# Patient Record
Sex: MALE | Race: WHITE
[De-identification: names, ages, dates, MRNs, and addresses within clinical notes are randomized per-mention and may not be internally consistent; named-entity substitution may affect disease eponyms.]

---

## 2017-05-25 NOTE — RO
DATE OF PROCEDURE:  05/24/2017

 

PREPROCEDURE DIAGNOSIS:  Cataract of right eye.

 

POSTPROCEDURE DIAGNOSIS:  Cataract of right eye.

 

PROCEDURE:  Femtosecond laser and phacoemulsification of the intraocular lens

with lens implantation right eye.  Intraocular lens power used was Hoya, 17.5

diopter.

 

SURGEON:  Burak Maxwell MD

 

ASSISTANT:  None.

 

ANESTHESIA:  Local IV standby.

 

FINDINGS:  Cataract of right eye.

 

COMPLICATIONS:  None.

 

DESCRIPTION OF PROCEDURE:  The patient was brought to the operating room and laid

in supine position.  A lid speculum was placed, and patient was brought under the

femtosecond laser.  After the satisfactory placement of the patient interface,

primary incision, secondary incision, and arcuate incisions with lens

fragmentation was done without any complication per plan.  The patients interface

was then removed and lid speculum removed.

 

Patient was placed under the microscope.  The eye was prepped and draped in a

sterile fashion for ophthalmic surgery.  Lid speculum was placed.  The secondary

incision was opened, and EndoCoat was injected into the anterior chamber.  The

temporal clear corneal incision was then opened and capsulorrhexis removed,

followed by hydrodissection.  This was followed by phacoemulsification of the

lens within the capsular bag.  Cortical material was then aspirated, and Healon

was injected into the capsular bag.  Intraocular lens was then placed.  Excess

Healon was aspirated.  Wound was hydrated.  The lid speculum was removed, and

patient was returned to the recovery room in stable condition.

## 2018-09-19 ENCOUNTER — HOSPITAL ENCOUNTER (OUTPATIENT)
Dept: HOSPITAL 53 - M SDC | Age: 68
Discharge: HOME | End: 2018-09-19
Attending: OPHTHALMOLOGY
Payer: MEDICARE

## 2018-09-19 DIAGNOSIS — Z79.82: ICD-10-CM

## 2018-09-19 DIAGNOSIS — I10: ICD-10-CM

## 2018-09-19 DIAGNOSIS — H25.9: Primary | ICD-10-CM

## 2018-09-19 DIAGNOSIS — E78.5: ICD-10-CM

## 2018-09-19 DIAGNOSIS — N40.0: ICD-10-CM

## 2018-09-19 DIAGNOSIS — Z79.899: ICD-10-CM

## 2018-09-19 PROCEDURE — 66984 XCAPSL CTRC RMVL W/O ECP: CPT

## 2018-09-19 RX ADMIN — CEFUROXIME 1 MG: 750 INJECTION, POWDER, FOR SOLUTION INTRAMUSCULAR; INTRAVENOUS at 10:37

## 2018-09-19 RX ADMIN — POVIDONE-IODINE 1 DROP: 5 SOLUTION OPHTHALMIC at 10:36

## 2018-09-19 RX ADMIN — Medication 1 EA: at 10:36

## 2018-09-19 RX ADMIN — LIDOCAINE HYDROCHLORIDE 1 %: 35 GEL OPHTHALMIC at 09:05

## 2018-09-19 RX ADMIN — Medication 1 EA: at 07:00

## 2018-09-19 RX ADMIN — CYCLOPENTOLATE HYDROCHLORIDE 1 DROP: 20 SOLUTION/ DROPS OPHTHALMIC at 09:05

## 2018-09-19 RX ADMIN — OFLOXACIN 1 DROP: 3 SOLUTION/ DROPS OPHTHALMIC at 09:05

## 2018-09-19 RX ADMIN — ACETAZOLAMIDE 1 MG: 500 CAPSULE, EXTENDED RELEASE ORAL at 11:15

## 2018-09-19 RX ADMIN — TROPICAMIDE 1 DROP: 10 SOLUTION/ DROPS OPHTHALMIC at 09:05

## 2018-09-19 RX ADMIN — LIDOCAINE HYDROCHLORIDE 1 ML: 10 INJECTION, SOLUTION EPIDURAL; INFILTRATION; INTRACAUDAL; PERINEURAL at 10:36

## 2018-09-19 RX ADMIN — TRIAMCINOLONE ACETONIDE 1 MG: 40 INJECTION, SUSPENSION OPHTHALMIC at 10:37

## 2018-09-19 RX ADMIN — PHENYLEPHRINE HYDROCHLORIDE 1 DROP: 25 SOLUTION/ DROPS OPHTHALMIC at 09:05
